# Patient Record
Sex: MALE | URBAN - METROPOLITAN AREA
[De-identification: names, ages, dates, MRNs, and addresses within clinical notes are randomized per-mention and may not be internally consistent; named-entity substitution may affect disease eponyms.]

---

## 2018-11-29 NOTE — PATIENT DISCUSSION
chalazion for 6-7 months  Comes and goes.  Warm compresses help, but does not resolve.  refer for eval.

## 2018-11-29 NOTE — PATIENT DISCUSSION
Explained need for incision and drainage of chronic inflammation. Risks and benefits discussed and patient desires to proceed.

## 2019-01-08 NOTE — PROCEDURE NOTE: CLINICAL
PROCEDURE NOTE: Chalazion Injection Right Lower Lid. Diagnosis: Chalazion. Prior to treatment, the risks/benefits/alternatives were discussed. The patient wished to proceed with procedure. Site of Injection: *. Kenalog *units injected. Patient tolerated procedure well. There were no complications. Post procedure instructions given. Altagracia Cutting PROCEDURE NOTE: Chalazion Injection Left Lower Lid. Diagnosis: Chalazion. Prior to treatment, the risks/benefits/alternatives were discussed. The patient wished to proceed with procedure. Site of Injection: *. Kenalog *units injected. Patient tolerated procedure well. There were no complications. Post procedure instructions given. Altagracia Cutting

## 2020-05-18 ENCOUNTER — IMPORTED ENCOUNTER (OUTPATIENT)
Dept: URBAN - METROPOLITAN AREA CLINIC 9 | Facility: CLINIC | Age: 60
End: 2020-05-18

## 2021-10-16 ASSESSMENT — KERATOMETRY
OD_AXISANGLE2_DEGREES: 177
OS_K2POWER_DIOPTERS: 46.75
OS_K1POWER_DIOPTERS: 46
OS_AXISANGLE2_DEGREES: 176
OS_AXISANGLE_DEGREES: 86
OD_K1POWER_DIOPTERS: 45.25
OD_AXISANGLE_DEGREES: 87
OD_K2POWER_DIOPTERS: 46

## 2021-10-16 ASSESSMENT — VISUAL ACUITY
OD_CC: 20/30 SN
OD_CC: 20/60 SN
OS_CC: 20/25 SN

## 2021-10-16 ASSESSMENT — TONOMETRY
OS_IOP_MMHG: 15
OD_IOP_MMHG: 15
